# Patient Record
Sex: MALE | Race: WHITE | Employment: FULL TIME | URBAN - METROPOLITAN AREA
[De-identification: names, ages, dates, MRNs, and addresses within clinical notes are randomized per-mention and may not be internally consistent; named-entity substitution may affect disease eponyms.]

---

## 2017-05-30 ENCOUNTER — APPOINTMENT (OUTPATIENT)
Dept: OTHER | Facility: HOSPITAL | Age: 32
End: 2017-05-30
Attending: PREVENTIVE MEDICINE

## 2017-10-18 ENCOUNTER — HOSPITAL ENCOUNTER (OUTPATIENT)
Age: 32
Discharge: ANOTHER HOSPITAL | End: 2017-10-18
Attending: EMERGENCY MEDICINE
Payer: COMMERCIAL

## 2017-10-18 ENCOUNTER — HOSPITAL ENCOUNTER (EMERGENCY)
Facility: HOSPITAL | Age: 32
Discharge: HOME | End: 2017-10-18
Attending: EMERGENCY MEDICINE
Payer: COMMERCIAL

## 2017-10-18 ENCOUNTER — APPOINTMENT (OUTPATIENT)
Dept: CT IMAGING | Facility: HOSPITAL | Age: 32
End: 2017-10-18
Attending: EMERGENCY MEDICINE
Payer: COMMERCIAL

## 2017-10-18 VITALS
TEMPERATURE: 97 F | HEART RATE: 67 BPM | SYSTOLIC BLOOD PRESSURE: 148 MMHG | OXYGEN SATURATION: 98 % | RESPIRATION RATE: 16 BRPM | DIASTOLIC BLOOD PRESSURE: 88 MMHG

## 2017-10-18 VITALS
RESPIRATION RATE: 16 BRPM | TEMPERATURE: 98 F | SYSTOLIC BLOOD PRESSURE: 133 MMHG | DIASTOLIC BLOOD PRESSURE: 89 MMHG | OXYGEN SATURATION: 99 % | BODY MASS INDEX: 22.53 KG/M2 | WEIGHT: 170 LBS | HEART RATE: 69 BPM | HEIGHT: 73 IN

## 2017-10-18 DIAGNOSIS — K29.70 GASTRITIS WITHOUT BLEEDING, UNSPECIFIED CHRONICITY, UNSPECIFIED GASTRITIS TYPE: ICD-10-CM

## 2017-10-18 DIAGNOSIS — R11.0 NAUSEA: Primary | ICD-10-CM

## 2017-10-18 DIAGNOSIS — R10.9 ABDOMINAL PAIN OF UNKNOWN ETIOLOGY: Primary | ICD-10-CM

## 2017-10-18 DIAGNOSIS — R10.10 UPPER ABDOMINAL PAIN: ICD-10-CM

## 2017-10-18 PROCEDURE — 99215 OFFICE O/P EST HI 40 MIN: CPT

## 2017-10-18 PROCEDURE — 96374 THER/PROPH/DIAG INJ IV PUSH: CPT

## 2017-10-18 PROCEDURE — 99284 EMERGENCY DEPT VISIT MOD MDM: CPT

## 2017-10-18 PROCEDURE — 80053 COMPREHEN METABOLIC PANEL: CPT | Performed by: EMERGENCY MEDICINE

## 2017-10-18 PROCEDURE — 93010 ELECTROCARDIOGRAM REPORT: CPT

## 2017-10-18 PROCEDURE — 96375 TX/PRO/DX INJ NEW DRUG ADDON: CPT

## 2017-10-18 PROCEDURE — 99205 OFFICE O/P NEW HI 60 MIN: CPT

## 2017-10-18 PROCEDURE — 83690 ASSAY OF LIPASE: CPT | Performed by: EMERGENCY MEDICINE

## 2017-10-18 PROCEDURE — 81003 URINALYSIS AUTO W/O SCOPE: CPT | Performed by: EMERGENCY MEDICINE

## 2017-10-18 PROCEDURE — 96361 HYDRATE IV INFUSION ADD-ON: CPT

## 2017-10-18 PROCEDURE — 74177 CT ABD & PELVIS W/CONTRAST: CPT | Performed by: EMERGENCY MEDICINE

## 2017-10-18 PROCEDURE — 93005 ELECTROCARDIOGRAM TRACING: CPT

## 2017-10-18 PROCEDURE — 85025 COMPLETE CBC W/AUTO DIFF WBC: CPT | Performed by: EMERGENCY MEDICINE

## 2017-10-18 RX ORDER — PANTOPRAZOLE SODIUM 40 MG/1
40 TABLET, DELAYED RELEASE ORAL DAILY
Qty: 30 TABLET | Refills: 0 | Status: SHIPPED | OUTPATIENT
Start: 2017-10-18 | End: 2017-11-17

## 2017-10-18 RX ORDER — KETOROLAC TROMETHAMINE 30 MG/ML
30 INJECTION, SOLUTION INTRAMUSCULAR; INTRAVENOUS ONCE
Status: COMPLETED | OUTPATIENT
Start: 2017-10-18 | End: 2017-10-18

## 2017-10-18 RX ORDER — ONDANSETRON 2 MG/ML
4 INJECTION INTRAMUSCULAR; INTRAVENOUS ONCE
Status: COMPLETED | OUTPATIENT
Start: 2017-10-18 | End: 2017-10-18

## 2017-10-18 RX ORDER — MAGNESIUM HYDROXIDE/ALUMINUM HYDROXICE/SIMETHICONE 120; 1200; 1200 MG/30ML; MG/30ML; MG/30ML
30 SUSPENSION ORAL ONCE
Status: COMPLETED | OUTPATIENT
Start: 2017-10-18 | End: 2017-10-18

## 2017-10-18 NOTE — ED INITIAL ASSESSMENT (HPI)
Patient states he developed nausea on Sunday. Threw up once that day and has had the nausea ever since. Decreased appetite. Denies vomiting since Sunday. Denies diarrhea. States he feels fine otherwise.  abd is slightly tender to touch in the epigastric are

## 2017-10-18 NOTE — ED PROVIDER NOTES
Patient presents with:  Nausea    HPI:     Nik Carey is a 27year old male who presents with chief complaint of nausea, epigastric and RUQ abdominal pain. Started 4 days ago with after eating a turkey sandwich.  States he has had significant nausea s preferred thorough evaluation in the ED to r/o hepatobiliary pathology. Pt feels comfortable driving to the ED for further evaluation. Understands to stop and call 911 of any acute, severe worsening of symptoms. Vitals stable upon discharge.      Assessm

## 2017-10-18 NOTE — ED PROVIDER NOTES
Patient Seen in: [facility] Emergency Department    History   Patient presents with:  Abdomen/Flank Pain (GI/)  Nausea/Vomiting/Diarrhea (gastrointestinal)    Stated Complaint: sent from immediate care for epigastric pain/nausea    HPI    This is negative except as noted above. PSFH elements reviewed from today and agreed except as otherwise stated in HPI.     Physical Exam   ED Triage Vitals [10/18/17 1026]  BP: 149/99  Pulse: 63  Resp: 14  Temp: 97.9 °F (36.6 °C)  Temp src: Temporal  SpO2: 99 % Final result                 Please view results for these tests on the individual orders.    RAINBOW DRAW GOLD       ============================================================  ED Course  ------------------------------------------------------------  MDM went back and reexamined most of his pain is in the epigastric area of discussed him he could be gastritis he has no significant right lower quadrant tenderness no rebound, guarding I discussed importance of close follow-up to return if increasing pain dis

## 2017-10-18 NOTE — ED INITIAL ASSESSMENT (HPI)
Pain to epigastric area and right side since Sunday. Has constant nausea, no vomiting. Denies any diarrhea or fevers.

## 2018-05-02 ENCOUNTER — APPOINTMENT (OUTPATIENT)
Dept: OTHER | Facility: HOSPITAL | Age: 33
End: 2018-05-02
Attending: FAMILY MEDICINE

## 2019-12-20 ENCOUNTER — HOSPITAL ENCOUNTER (OUTPATIENT)
Age: 34
Discharge: HOME | End: 2019-12-20
Attending: FAMILY MEDICINE
Payer: COMMERCIAL

## 2019-12-20 VITALS
DIASTOLIC BLOOD PRESSURE: 72 MMHG | RESPIRATION RATE: 18 BRPM | TEMPERATURE: 97 F | SYSTOLIC BLOOD PRESSURE: 126 MMHG | HEART RATE: 70 BPM | OXYGEN SATURATION: 100 %

## 2019-12-20 DIAGNOSIS — J45.40 MODERATE PERSISTENT ASTHMA WITHOUT COMPLICATION: Primary | ICD-10-CM

## 2019-12-20 PROCEDURE — 99213 OFFICE O/P EST LOW 20 MIN: CPT

## 2019-12-20 PROCEDURE — 99214 OFFICE O/P EST MOD 30 MIN: CPT

## 2019-12-20 NOTE — ED PROVIDER NOTES
Patient Seen in: [facility] [place] [facility]      History   Patient presents with:  Asthma    Stated Complaint: need medicine refill     HPI    64-year-old male with history of asthma and seasonal allergies presents for refill on Breo.   Patient state tympanic membrane, ear canal and external ear normal.      Nose: Nose normal.      Mouth/Throat:      Pharynx: Uvula midline.    Eyes:      General: Lids are normal.      Conjunctiva/sclera: Conjunctivae normal.      Pupils: Pupils are equal, round, and lynnette

## 2019-12-20 NOTE — ED INITIAL ASSESSMENT (HPI)
Has history of asthma and needs refill on breo inhaler. Had samples and thought he would be fine, but noticed they are  and his pcp could not see him before he leaves out of town.

## 2020-02-18 ENCOUNTER — OFFICE VISIT (OUTPATIENT)
Dept: FAMILY MEDICINE CLINIC | Facility: CLINIC | Age: 35
End: 2020-02-18
Payer: COMMERCIAL

## 2020-02-18 VITALS
OXYGEN SATURATION: 99 % | BODY MASS INDEX: 23.19 KG/M2 | HEIGHT: 73 IN | SYSTOLIC BLOOD PRESSURE: 126 MMHG | WEIGHT: 175 LBS | TEMPERATURE: 98 F | HEART RATE: 63 BPM | DIASTOLIC BLOOD PRESSURE: 70 MMHG | RESPIRATION RATE: 16 BRPM

## 2020-02-18 DIAGNOSIS — J45.40 MODERATE PERSISTENT ASTHMA WITHOUT COMPLICATION: Primary | ICD-10-CM

## 2020-02-18 DIAGNOSIS — Z23 NEED FOR PNEUMOCOCCAL VACCINATION: ICD-10-CM

## 2020-02-18 PROCEDURE — 90471 IMMUNIZATION ADMIN: CPT | Performed by: FAMILY MEDICINE

## 2020-02-18 PROCEDURE — 90732 PPSV23 VACC 2 YRS+ SUBQ/IM: CPT | Performed by: FAMILY MEDICINE

## 2020-02-18 PROCEDURE — 99203 OFFICE O/P NEW LOW 30 MIN: CPT | Performed by: FAMILY MEDICINE

## 2020-02-18 NOTE — PROGRESS NOTES
Patient presents with:  Asthma: ACT/AAP 22 score  Establish Care: New to PCP      HPI:     This 38year old male patient with known history of asthma presents for management of asthma and medication refills.  He has been on several different inhalers in the tightness, shortness of breath and wheezing. Cardiovascular: Negative for chest pain, palpitations and leg swelling. Gastrointestinal: Negative for nausea, vomiting, abdominal pain, diarrhea, blood in stool and abdominal distention.    Genitourinary: N persistent asthma without complication G70.35 Fluticasone Furoate-Vilanterol (BREO ELLIPTA) 100-25 MCG/INH Inhalation Aerosol Powder, Breath Activated   2. Need for pneumococcal vaccination Z23 PNEUMOCOCCAL IMM (PNEUMOVAX)     Asthma - maintenance.  Current

## 2020-02-18 NOTE — PATIENT INSTRUCTIONS
Using an Inhaler  Your healthcare provider may prescribe medicine that you breathe in using a metered-dose inhaler (MDI). An MDI sends a measured amount of medicine in a fine mist to your lungs.  The medicine must be breathed deeply into your lungs for it comfortably. · Then breathe out slowly through your mouth. · Repeat these steps for each puff of medicine prescribed. Wait at least 60 seconds between puffs, or as long as your healthcare provider told you to wait.     Important  · Clean your inhaler as d enjoy.  · Asthma flare-ups can be dangerous, even deadly. · Uncontrolled asthma makes it more likely that you will need emergency department and in-hospital care. · Uncontrolled asthma may cause permanent damage to your lungs.     Peak flow monitoring hel

## 2020-05-11 DIAGNOSIS — J45.40 MODERATE PERSISTENT ASTHMA WITHOUT COMPLICATION: ICD-10-CM

## 2020-05-11 RX ORDER — FLUTICASONE FUROATE AND VILANTEROL TRIFENATATE 100; 25 UG/1; UG/1
POWDER RESPIRATORY (INHALATION)
Qty: 60 EACH | Refills: 0 | OUTPATIENT
Start: 2020-05-11

## 2020-05-11 NOTE — TELEPHONE ENCOUNTER
Asthma & COPD Medication Protocol Passed5/11 10:36 AM   Asthma Action Score greater than or equal to 20    Appointment in past 6 or next 3 months     AAP/ACT given in last 12 months     Requesting Fluticasone Furoate-Vilanterol (BREO ELLIPTA) 100-25 MCG/IN

## 2020-08-08 DIAGNOSIS — J45.40 MODERATE PERSISTENT ASTHMA WITHOUT COMPLICATION: ICD-10-CM

## 2020-08-11 RX ORDER — FLUTICASONE FUROATE AND VILANTEROL TRIFENATATE 100; 25 UG/1; UG/1
POWDER RESPIRATORY (INHALATION)
Qty: 3 EACH | Refills: 1 | Status: SHIPPED | OUTPATIENT
Start: 2020-08-11 | End: 2021-03-05

## 2020-08-11 NOTE — TELEPHONE ENCOUNTER
Asthma & COPD Medication Protocol Passed8/11 3:02 PM   Asthma Action Score greater than or equal to 20    Appointment in past 6 or next 3 months     AAP/ACT given in last 12 months     Refill protocol passed because the patient met the following protocol f

## 2020-08-11 NOTE — TELEPHONE ENCOUNTER
Received refill request. [facility] Patient was seen by  on 2/18/2020 as a new patient. Please call patient to confirm if he is still a patient of  or if he changed. Thanks!

## 2021-01-01 ENCOUNTER — OFFICE VISIT (OUTPATIENT)
Dept: FAMILY MEDICINE CLINIC | Facility: CLINIC | Age: 36
End: 2021-01-01
Payer: COMMERCIAL

## 2021-01-01 VITALS
BODY MASS INDEX: 23.19 KG/M2 | DIASTOLIC BLOOD PRESSURE: 99 MMHG | HEIGHT: 73 IN | TEMPERATURE: 98 F | RESPIRATION RATE: 18 BRPM | OXYGEN SATURATION: 98 % | WEIGHT: 175 LBS | SYSTOLIC BLOOD PRESSURE: 145 MMHG | HEART RATE: 87 BPM

## 2021-01-01 DIAGNOSIS — Z20.822 EXPOSURE TO COVID-19 VIRUS: ICD-10-CM

## 2021-01-01 DIAGNOSIS — R03.0 ELEVATED BLOOD PRESSURE READING: ICD-10-CM

## 2021-01-01 DIAGNOSIS — Z20.822 SUSPECTED COVID-19 VIRUS INFECTION: Primary | ICD-10-CM

## 2021-01-01 PROCEDURE — 3008F BODY MASS INDEX DOCD: CPT | Performed by: NURSE PRACTITIONER

## 2021-01-01 PROCEDURE — 99213 OFFICE O/P EST LOW 20 MIN: CPT | Performed by: NURSE PRACTITIONER

## 2021-01-01 PROCEDURE — 3080F DIAST BP >= 90 MM HG: CPT | Performed by: NURSE PRACTITIONER

## 2021-01-01 PROCEDURE — 3077F SYST BP >= 140 MM HG: CPT | Performed by: NURSE PRACTITIONER

## 2021-01-01 NOTE — PATIENT INSTRUCTIONS
Regardless of the test results you are ill. You should practice social distancing which means stay about 6 feet from people at all times, cover your cough, wash hands frequently, stay home away from others, and sanitize surfaces often.  Rest and stay hydrat you    Reducing the length of quarantine may make it easier for people to quarantine by reducing the time they cannot work. A shorter quarantine period also can lessen stress on the public health system, especially when new infections are rapidly rising. for at least 20 seconds or clean your hands with an alcohol-based hand  that contains at least 60% alcohol. 8. As much as possible, stay in a specific room and away from other people in your home.  Also, you should use a separate bathroom, if lorin of breath but have not been exposed to someone with COVID-19 and have not tested positive for COVID-19, you should also stay home and away from others for a total of 10 days after your symptoms started, and at least 24 hours after your fever is gone and sy required to have a repeat COVID-19 test in order to be eligible to donate. If you’re instructed by Blake Garcia that a repeat test is required, please contact the [address] [facility] COVID-19 Nurse Triage Line at [phone].     Additional Information      You can cough or sneeze into the bend of your elbow. · Wear a cloth face mask around other people. During a public health emergency, medical face masks may be reserved for healthcare workers. You may need to make a cloth face mask of your own.  You can do this usi be reserved for healthcare workers. You may need to make a cloth face mask of your own. You can do this using a bandana, T-shirt, or other cloth. The CDC has instructions on how to make a face mask. Wear the mask so that it covers both your nose and mouth. which fluids are best for you. Don't drink fluids that contain caffeine or alcohol. · Taking over-the-counter (OTC) pain medicine. These are used to help ease pain and reduce fever.  Follow your healthcare provider's instructions for which OTC medicine to with COVID-19, you should stay away from other people. This is called self-isolation. Your limits are different if you've had COVID-19 in the last 3 months but are fully recovered without symptoms and you have been exposed to someone with COVID-19.  If you retest you for COVID-19. Follow your provider's instructions.   When you return to public settings  When you are well enough to go outside your home, consider the CDC's guidance on cloth face masks:     · The CDC advises all people over age 2 to wear cloth on 4/1/2020  © 0272-1951 [employer]. All rights reserved. This information is not intended as a substitute for professional medical care. Always follow your healthcare professional's instructions.

## 2021-01-01 NOTE — PROGRESS NOTES
CHIEF COMPLAINT:   Patient presents with:  Covid: exposure, wife positive, symptoms        HPI:   Clyde Anaya is a 38year old male presents to clinic with complaints of ill symptoms. Patient has had symptoms for 2 days.    Reports: headache, nausea, NOSE: nostrils patent, clear nasal mucus, nasal mucosa pink and mild inflamed  THROAT: oral mucosa pink, moist. Posterior pharynx not erythematous and injected. No exudates. Tonsils 1/4. Uvula is midline. Breath is not malodorous.   No trismus, hoarseness Seek immediate care for worsening or concerning symptoms. Follow up with your primary care doctor if not improving.      Coronavirus Disease 2019 (COVID-19)     [facility] is committed to the safety and well-being of our patients, members, Madiha Judd Your local public health authorities make the final decisions about how long quarantine should last, based on local conditions and needs. Follow the recommendations of your local public health department if you need to quarantine.  Options they will conside 8. As much as possible, stay in a specific room and away from other people in your home. Also, you should use a separate bathroom, if available. If you need to be around other people in or outside of the home, wear a facemask.    9. Avoid sharing personal i If you have a fever with cough or shortness of breath but have not been exposed to someone with COVID-19 and have not tested positive for COVID-19, you should also stay home and away from others for a total of 10 days after your symptoms started, and at [employer] · Be symptom-free for at least 14 days*    *Some people will be required to have a repeat COVID-19 test in order to be eligible to donate.  If you’re instructed by Chiara Dalal that a repeat test is required, please contact the [facility] COVID-[facility] Nurse Tri · If you need to cough or sneeze, do it into a tissue. Then throw the tissue into the trash. If you don't have tissues, cough or sneeze into the bend of your elbow. · Wear a cloth face mask around other people.  During a public health emergency, medical fa · Wear a face mask. This is to protect other people from your germs. If you are not able to wear a mask, your caregivers should. During a public health emergency, medical face masks may be reserved for healthcare workers.  You may need to make a cloth face · Staying hydrated. Drinking liquids is the best way to prevent dehydration. Try to drink 6 to 8 glasses of liquids every day, or as advised by your provider. Also check with your provider about which fluids are best for you.  Don't drink fluids that conta · Don’t let anyone share household items with the sick person. This includes eating and drinking tools, towels, sheets, or blankets. · Clean fabrics and laundry thoroughly. · Keep other people and pets away from the sick person.     When you can stop self If you have a weak immune system and COVID-19, or if you've had severe COVID-19,  your instructions on when to stop isolation will be somewhat different. Some conditions and treatments can cause a weak immune system.  These include cancer treatment, bone ma · Confusion or trouble waking  · Fainting or loss of consciousness  · Coughing up blood  Going home from the hospital  If you were diagnosed with COVID-19 and were recently discharged from the hospital:  · Follow the instructions above for self-care and is

## 2021-01-03 LAB — SARS-COV-2 RNA,QUAL, RT-PCR: NOT DETECTED

## 2021-02-10 DIAGNOSIS — J45.40 MODERATE PERSISTENT ASTHMA WITHOUT COMPLICATION: ICD-10-CM

## 2021-02-10 NOTE — TELEPHONE ENCOUNTER
Requesting Breo Ellipta 100/25mcg/inh  LOV: 2/18/2020  RTC: prn  Last Relevant Labs: ACT/AAP done 3/2/2020  Filled: 8/11/2020 #3 with 1 refills    No future appointments.     Failed protocol:  Asthma & COPD Medication Protocol Ihgtlf00/10/2021 08:52 AM   Ap

## 2021-02-11 DIAGNOSIS — Z23 NEED FOR VACCINATION: ICD-10-CM

## 2021-03-04 NOTE — TELEPHONE ENCOUNTER
Multiple phone attmepts made to have pt schedule appt. No future appointments. [employer] message will be sent. He is active on [employer], he updated his occupation through [employer] on 2/2/2021. No future appointments.

## 2021-03-05 RX ORDER — FLUTICASONE FUROATE AND VILANTEROL TRIFENATATE 100; 25 UG/1; UG/1
POWDER RESPIRATORY (INHALATION)
Qty: 180 EACH | Refills: 0 | Status: SHIPPED | OUTPATIENT
Start: 2021-03-05 | End: 2021-06-09

## 2021-06-09 ENCOUNTER — OFFICE VISIT (OUTPATIENT)
Dept: FAMILY MEDICINE CLINIC | Facility: CLINIC | Age: 36
End: 2021-06-09
Payer: COMMERCIAL

## 2021-06-09 VITALS
DIASTOLIC BLOOD PRESSURE: 80 MMHG | HEIGHT: 73 IN | TEMPERATURE: 98 F | SYSTOLIC BLOOD PRESSURE: 135 MMHG | BODY MASS INDEX: 24.52 KG/M2 | WEIGHT: 185 LBS | RESPIRATION RATE: 16 BRPM | HEART RATE: 77 BPM | OXYGEN SATURATION: 98 %

## 2021-06-09 DIAGNOSIS — J45.40 MODERATE PERSISTENT ASTHMA WITHOUT COMPLICATION: ICD-10-CM

## 2021-06-09 DIAGNOSIS — Z00.00 WELL ADULT EXAM: Primary | ICD-10-CM

## 2021-06-09 DIAGNOSIS — J45.909 ASTHMA: ICD-10-CM

## 2021-06-09 DIAGNOSIS — I10 ELEVATED BLOOD PRESSURE READING IN OFFICE WITH WHITE COAT SYNDROME, WITH DIAGNOSIS OF HYPERTENSION: ICD-10-CM

## 2021-06-09 DIAGNOSIS — Z80.8 FAMILY HISTORY OF MELANOMA: ICD-10-CM

## 2021-06-09 PROCEDURE — 3075F SYST BP GE 130 - 139MM HG: CPT | Performed by: FAMILY MEDICINE

## 2021-06-09 PROCEDURE — 3079F DIAST BP 80-89 MM HG: CPT | Performed by: FAMILY MEDICINE

## 2021-06-09 PROCEDURE — 99395 PREV VISIT EST AGE 18-39: CPT | Performed by: FAMILY MEDICINE

## 2021-06-09 PROCEDURE — 3008F BODY MASS INDEX DOCD: CPT | Performed by: FAMILY MEDICINE

## 2021-06-09 RX ORDER — FLUTICASONE FUROATE AND VILANTEROL TRIFENATATE 100; 25 UG/1; UG/1
1 POWDER RESPIRATORY (INHALATION) DAILY
Qty: 60 EACH | Refills: 3 | Status: SHIPPED | OUTPATIENT
Start: 2021-06-09 | End: 2021-09-08

## 2021-06-09 RX ORDER — FLUTICASONE FUROATE AND VILANTEROL TRIFENATATE 100; 25 UG/1; UG/1
1 POWDER RESPIRATORY (INHALATION) DAILY
Qty: 180 EACH | Refills: 0 | Status: SHIPPED | OUTPATIENT
Start: 2021-06-09 | End: 2021-06-09

## 2021-06-09 RX ORDER — ALBUTEROL SULFATE 90 UG/1
2 AEROSOL, METERED RESPIRATORY (INHALATION) EVERY 4 HOURS PRN
Qty: 3 EACH | Refills: 0 | Status: SHIPPED | OUTPATIENT
Start: 2021-06-09

## 2021-06-09 NOTE — PROGRESS NOTES
Patient presents with:  Physical: Routine annual physical  Asthma: ACT/AAP      HPI:     This 43year old male patient with known history of asthma presents for management of asthma and medication refills.  He has been on several different inhalers in the p Disorder Mother    • Breast Cancer Maternal Grandmother    • Kidney Disease Maternal Grandfather       Social History    Tobacco Use      Smoking status: Never Smoker      Smokeless tobacco: Never Used    Vaping Use      Vaping Use: Never used    Alcohol u canals clear  NOSE: nasal turbinates: pink, normal mucosa  THROAT: clear, without exudates  LUNGS: clear to auscultation bilaterally; no rales, rhonchi, or wheezes  CARDIO:  regular rate and rhythm without murmur      MDM/Assessment/Plan:     Orders for th Asthma - maintenance. Currently asthma is under good control. Peak flows are  at goal.  Compliance with daily peak flows and medication encouraged and importance explained. Medications : Breo 1 puff QD. Asthma Action Plan reviewed with patient.   Summer Suh

## 2021-06-10 NOTE — PATIENT INSTRUCTIONS
Prevention Guidelines, Men Ages 25 to 44  Screening tests and vaccines are an important part of managing your health. A screening test is done to find possible disorders or diseases in people who don't have any symptoms.  The goal is to find a disease ear vaccine 2 doses; the second dose should be given at least 4 weeks after the first dose   Hepatitis A Men at increased risk for infection – talk with your healthcare provider 2 doses given at least 6 months apart   Hepatitis B Men at increased risk for infe be reminded to avoid intentional tanning and tanning beds. 1Those who are 25years of age, who are not up-to-date on their childhood immunizations, should get all appropriate catch-up vaccines recommended by the CDC.    Polo last reviewed this educat

## 2021-09-08 DIAGNOSIS — J45.40 MODERATE PERSISTENT ASTHMA WITHOUT COMPLICATION: ICD-10-CM

## 2021-09-08 RX ORDER — FLUTICASONE FUROATE AND VILANTEROL TRIFENATATE 100; 25 UG/1; UG/1
1 POWDER RESPIRATORY (INHALATION) DAILY
Qty: 180 EACH | Refills: 1 | Status: SHIPPED | OUTPATIENT
Start: 2021-09-08 | End: 2021-12-13

## 2021-09-08 NOTE — TELEPHONE ENCOUNTER
Asthma & COPD Medication Protocol Pjldmx8709/08/2021 07:33 AM   Asthma Action Score greater than or equal to 20    Appointment in past 6 or next 3 months     AAP/ACT given in last 12 months     Refill protocol passed because the patient met the following pro

## 2021-12-13 DIAGNOSIS — J45.40 MODERATE PERSISTENT ASTHMA WITHOUT COMPLICATION: ICD-10-CM

## 2022-01-24 ENCOUNTER — OFFICE VISIT (OUTPATIENT)
Dept: FAMILY MEDICINE CLINIC | Facility: CLINIC | Age: 37
End: 2022-01-24
Payer: COMMERCIAL

## 2022-01-24 VITALS
HEART RATE: 90 BPM | WEIGHT: 180.38 LBS | OXYGEN SATURATION: 98 % | BODY MASS INDEX: 23.91 KG/M2 | TEMPERATURE: 97 F | HEIGHT: 73 IN | DIASTOLIC BLOOD PRESSURE: 90 MMHG | SYSTOLIC BLOOD PRESSURE: 128 MMHG | RESPIRATION RATE: 16 BRPM

## 2022-01-24 DIAGNOSIS — M54.2 NECK PAIN: Primary | ICD-10-CM

## 2022-01-24 PROCEDURE — 3080F DIAST BP >= 90 MM HG: CPT | Performed by: FAMILY MEDICINE

## 2022-01-24 PROCEDURE — 99214 OFFICE O/P EST MOD 30 MIN: CPT | Performed by: FAMILY MEDICINE

## 2022-01-24 PROCEDURE — 3074F SYST BP LT 130 MM HG: CPT | Performed by: FAMILY MEDICINE

## 2022-01-24 PROCEDURE — 3008F BODY MASS INDEX DOCD: CPT | Performed by: FAMILY MEDICINE

## 2022-01-24 RX ORDER — CEPHALEXIN 500 MG/1
500 CAPSULE ORAL 2 TIMES DAILY
Qty: 20 CAPSULE | Refills: 0 | Status: SHIPPED | OUTPATIENT
Start: 2022-01-24 | End: 2022-02-03

## 2022-01-25 NOTE — PROGRESS NOTES
HPI:   Scarlett Lubin is a 43year old male that presents for Patient presents with:  Neck Pain: on right side - states its day 3-4 with the pain    Patient states of having some neck pain on the right side of his neck he points to the right side of the t EOMI, PERRLA, no scleral icterus, conjunctivae clear, no eye discharge        Ears: both external ear canals without any swelling or redness, both tympanic membranes appear without erythema or effusion or rupture.         Nose: both nares are patent without

## 2022-06-02 DIAGNOSIS — J45.40 MODERATE PERSISTENT ASTHMA WITHOUT COMPLICATION: ICD-10-CM

## 2022-06-06 RX ORDER — FLUTICASONE FUROATE AND VILANTEROL TRIFENATATE 100; 25 UG/1; UG/1
1 POWDER RESPIRATORY (INHALATION) DAILY
Qty: 180 EACH | Refills: 0 | Status: SHIPPED | OUTPATIENT
Start: 2022-06-06

## 2022-07-25 ENCOUNTER — PATIENT MESSAGE (OUTPATIENT)
Dept: FAMILY MEDICINE CLINIC | Facility: CLINIC | Age: 37
End: 2022-07-25

## 2022-07-26 NOTE — TELEPHONE ENCOUNTER
Patient declines appt at this time. Advised if symptoms worsen to call us or go to nearest ER for evaluation. Home

## 2022-07-26 NOTE — TELEPHONE ENCOUNTER
From: Ramone Ford  To: Yoly Mercado DO  Sent: 7/25/2022 10:07 AM CDT  Subject: Covid    Dr. Anabella Cameron,  I hope all is well with you and your family. I recently contracted covid (first time) and I wanted to see what, if any additional meds I could be taking. I have had a low grade fever for the last 3 days, headache, sore throat and some tightness in my chest with a cough. I have been taking Tylenol and over the counter cold meds. Any guidance you can provide would be appreciated.

## 2022-09-26 ENCOUNTER — TELEPHONE (OUTPATIENT)
Dept: FAMILY MEDICINE CLINIC | Facility: CLINIC | Age: 37
End: 2022-09-26

## 2022-09-26 NOTE — TELEPHONE ENCOUNTER
Flutic/vilan generic for breo ellipta 100-25mcg oral #60 Plan does not cover. Fax back with approval, placed in MA's folder.

## 2022-09-28 DIAGNOSIS — J45.40 MODERATE PERSISTENT ASTHMA WITHOUT COMPLICATION: ICD-10-CM

## 2022-09-30 RX ORDER — FLUTICASONE FUROATE AND VILANTEROL TRIFENATATE 100; 25 UG/1; UG/1
1 POWDER RESPIRATORY (INHALATION) DAILY
Qty: 60 EACH | Refills: 0 | Status: SHIPPED | OUTPATIENT
Start: 2022-09-30

## 2022-10-21 ENCOUNTER — TELEMEDICINE (OUTPATIENT)
Dept: FAMILY MEDICINE CLINIC | Facility: CLINIC | Age: 37
End: 2022-10-21

## 2022-10-21 VITALS — DIASTOLIC BLOOD PRESSURE: 86 MMHG | SYSTOLIC BLOOD PRESSURE: 122 MMHG

## 2022-10-21 DIAGNOSIS — J45.40 MODERATE PERSISTENT ASTHMA WITHOUT COMPLICATION: ICD-10-CM

## 2022-10-21 DIAGNOSIS — J45.909 ASTHMA: ICD-10-CM

## 2022-10-21 PROCEDURE — 99213 OFFICE O/P EST LOW 20 MIN: CPT | Performed by: FAMILY MEDICINE

## 2022-10-21 PROCEDURE — 3079F DIAST BP 80-89 MM HG: CPT | Performed by: FAMILY MEDICINE

## 2022-10-21 PROCEDURE — 3074F SYST BP LT 130 MM HG: CPT | Performed by: FAMILY MEDICINE

## 2022-10-21 RX ORDER — FLUTICASONE FUROATE AND VILANTEROL 100; 25 UG/1; UG/1
1 POWDER RESPIRATORY (INHALATION) DAILY
Qty: 60 EACH | Refills: 2 | Status: SHIPPED | OUTPATIENT
Start: 2022-10-21

## 2022-10-21 RX ORDER — ALBUTEROL SULFATE 90 UG/1
2 AEROSOL, METERED RESPIRATORY (INHALATION) EVERY 4 HOURS PRN
Qty: 3 EACH | Refills: 1 | Status: SHIPPED | OUTPATIENT
Start: 2022-10-21

## 2022-12-23 ENCOUNTER — TELEPHONE (OUTPATIENT)
Dept: SCHEDULING | Age: 37
End: 2022-12-23

## 2023-03-26 DIAGNOSIS — J45.40 MODERATE PERSISTENT ASTHMA WITHOUT COMPLICATION: ICD-10-CM

## 2023-03-28 RX ORDER — FLUTICASONE FUROATE AND VILANTEROL TRIFENATATE 100; 25 UG/1; UG/1
POWDER RESPIRATORY (INHALATION)
Qty: 60 EACH | Refills: 2 | Status: SHIPPED | OUTPATIENT
Start: 2023-03-28

## 2023-05-03 ENCOUNTER — WALK IN (OUTPATIENT)
Dept: URGENT CARE | Age: 38
End: 2023-05-03

## 2023-05-03 VITALS
DIASTOLIC BLOOD PRESSURE: 96 MMHG | WEIGHT: 165 LBS | RESPIRATION RATE: 20 BRPM | SYSTOLIC BLOOD PRESSURE: 136 MMHG | HEART RATE: 100 BPM | TEMPERATURE: 98.9 F | BODY MASS INDEX: 21.87 KG/M2 | HEIGHT: 73 IN

## 2023-05-03 DIAGNOSIS — J02.0 STREP PHARYNGITIS: Primary | ICD-10-CM

## 2023-05-03 DIAGNOSIS — J30.9 ALLERGIC RHINITIS, UNSPECIFIED SEASONALITY, UNSPECIFIED TRIGGER: ICD-10-CM

## 2023-05-03 PROBLEM — I10 ELEVATED BLOOD PRESSURE READING IN OFFICE WITH WHITE COAT SYNDROME, WITH DIAGNOSIS OF HYPERTENSION: Status: ACTIVE | Noted: 2021-06-09

## 2023-05-03 PROBLEM — T78.40XA ALLERGIES: Status: ACTIVE | Noted: 2023-05-03

## 2023-05-03 LAB
INTERNAL PROCEDURAL CONTROLS ACCEPTABLE: YES
S PYO AG THROAT QL IA.RAPID: POSITIVE
TEST LOT EXPIRATION DATE: ABNORMAL
TEST LOT NUMBER: ABNORMAL

## 2023-05-03 PROCEDURE — 3075F SYST BP GE 130 - 139MM HG: CPT | Performed by: NURSE PRACTITIONER

## 2023-05-03 PROCEDURE — 99202 OFFICE O/P NEW SF 15 MIN: CPT | Performed by: NURSE PRACTITIONER

## 2023-05-03 PROCEDURE — 3080F DIAST BP >= 90 MM HG: CPT | Performed by: NURSE PRACTITIONER

## 2023-05-03 PROCEDURE — 87880 STREP A ASSAY W/OPTIC: CPT | Performed by: NURSE PRACTITIONER

## 2023-05-03 RX ORDER — ALBUTEROL SULFATE 90 UG/1
AEROSOL, METERED RESPIRATORY (INHALATION)
COMMUNITY

## 2023-05-03 RX ORDER — AMOXICILLIN 500 MG/1
500 TABLET, FILM COATED ORAL 2 TIMES DAILY
Qty: 20 TABLET | Refills: 0 | Status: SHIPPED | OUTPATIENT
Start: 2023-05-03 | End: 2023-05-13

## 2023-05-03 RX ORDER — FLUTICASONE FUROATE AND VILANTEROL TRIFENATATE 100; 25 UG/1; UG/1
1 POWDER RESPIRATORY (INHALATION) DAILY
COMMUNITY
Start: 2023-03-28

## 2023-05-05 ENCOUNTER — TELEPHONE (OUTPATIENT)
Dept: URGENT CARE | Age: 38
End: 2023-05-05

## 2023-07-05 DIAGNOSIS — J45.40 MODERATE PERSISTENT ASTHMA WITHOUT COMPLICATION: ICD-10-CM

## 2023-07-05 DIAGNOSIS — Z00.00 LABORATORY EXAM ORDERED AS PART OF ROUTINE GENERAL MEDICAL EXAMINATION: Primary | ICD-10-CM

## 2023-07-05 RX ORDER — FLUTICASONE FUROATE AND VILANTEROL TRIFENATATE 100; 25 UG/1; UG/1
POWDER RESPIRATORY (INHALATION)
Qty: 60 EACH | Refills: 1 | Status: SHIPPED | OUTPATIENT
Start: 2023-07-05

## 2023-07-05 NOTE — TELEPHONE ENCOUNTER
Requested Renewals     Name from pharmacy: [facility] 100-25MCG ORAL INH(30)         Will file in chart as: BREO ELLIPTA 100-25 MCG/ACT Inhalation Aerosol Powder, Breath Activated    Sig: INHALE 1 PUFF INTO THE LUNGS DAILY    Disp: 61 each    Refills: 2 (Pharmacy requested: Not specified)    Start: 7/5/2023    Class: Normal    Non-formulary For: Moderate persistent asthma without complication    Last ordered: 3 months ago by Neda Mckeon DO Last refill: 6/3/2023    Rx #: [id number]    Asthma & COPD Medication Protocol Uzsuuw1307/05/2023 11:52 AM    Asthma Action Score greater than or equal to 20    Appointment in past 6 or next 3 months    AAP/ACT given in last 12 months             No future appointments. LOV: 10/21/22 for asthma   Last physical done 6/9/21    [employer] message sent to patient to schedule annual exam and to have labs done beforehand.    -medication and labs pended for review.

## 2023-10-04 DIAGNOSIS — J45.40 MODERATE PERSISTENT ASTHMA WITHOUT COMPLICATION: ICD-10-CM

## 2023-10-05 RX ORDER — FLUTICASONE FUROATE AND VILANTEROL 100; 25 UG/1; UG/1
1 POWDER RESPIRATORY (INHALATION) DAILY
Qty: 60 EACH | Refills: 1 | Status: SHIPPED | OUTPATIENT
Start: 2023-10-05

## 2023-10-05 NOTE — TELEPHONE ENCOUNTER
Requested Renewals     Name from pharmacy: [facility] 100-25MCG ORAL INH(30)         Will file in chart as: BREO ELLIPTA 100-25 MCG/ACT Inhalation Aerosol Powder, Breath Activated    Sig: INHALE 1 PUFF INTO THE LUNGS DAILY    Disp: 61 each    Refills: 1 (Pharmacy requested: Not specified)    Start: 10/4/2023    Class: Normal    Non-formulary For: Moderate persistent asthma without complication    Last ordered: 3 months ago (7/5/2023) by Tiana Montgomery DO    Last refill: 9/1/2023    Rx #: [id number]    Asthma & COPD Medication Protocol Zguyqt32/04/2023 07:32 AM    Asthma Action Score greater than or equal to 20    AAP/ACT given in last 12 months    Appointment in past 6 or next 3 months             Future Appointments   Date Time Provider Marycarmen Kahn   11/7/2023  2:15 PM Cephas Galeazzi, DO [facility] [address] [facility] [address]     LOV: 10/21/22 telemedicine  Last refill given on 7/5/23 for #60 with 1 refill    -medication pended for review.

## 2023-11-07 ENCOUNTER — OFFICE VISIT (OUTPATIENT)
Dept: FAMILY MEDICINE CLINIC | Facility: CLINIC | Age: 38
End: 2023-11-07
Payer: COMMERCIAL

## 2023-11-07 VITALS — SYSTOLIC BLOOD PRESSURE: 126 MMHG | DIASTOLIC BLOOD PRESSURE: 86 MMHG

## 2023-11-07 DIAGNOSIS — Z23 NEED FOR PNEUMOCOCCAL VACCINE: ICD-10-CM

## 2023-11-07 DIAGNOSIS — Z23 NEED FOR TDAP VACCINATION: ICD-10-CM

## 2023-11-07 DIAGNOSIS — Z00.00 WELL ADULT EXAM: Primary | ICD-10-CM

## 2023-11-07 DIAGNOSIS — Z23 FLU VACCINE NEED: ICD-10-CM

## 2023-11-07 DIAGNOSIS — Z80.8 FAMILY HISTORY OF MELANOMA: ICD-10-CM

## 2023-11-07 DIAGNOSIS — J45.40 MODERATE PERSISTENT ASTHMA WITHOUT COMPLICATION: ICD-10-CM

## 2023-11-07 DIAGNOSIS — E78.1 HIGH TRIGLYCERIDES: ICD-10-CM

## 2023-11-07 DIAGNOSIS — J45.909 ASTHMA: ICD-10-CM

## 2023-11-07 DIAGNOSIS — E78.6 LOW HDL (UNDER 40): ICD-10-CM

## 2023-11-07 PROCEDURE — 3074F SYST BP LT 130 MM HG: CPT | Performed by: FAMILY MEDICINE

## 2023-11-07 PROCEDURE — 3079F DIAST BP 80-89 MM HG: CPT | Performed by: FAMILY MEDICINE

## 2023-11-07 PROCEDURE — 99396 PREV VISIT EST AGE 40-64: CPT | Performed by: FAMILY MEDICINE

## 2023-11-07 RX ORDER — ALBUTEROL SULFATE 90 UG/1
2 AEROSOL, METERED RESPIRATORY (INHALATION) EVERY 4 HOURS PRN
Qty: 3 EACH | Refills: 5 | Status: SHIPPED | OUTPATIENT
Start: 2023-11-07

## 2023-11-07 RX ORDER — FLUTICASONE FUROATE AND VILANTEROL 100; 25 UG/1; UG/1
1 POWDER RESPIRATORY (INHALATION) DAILY
Qty: 60 EACH | Refills: 5 | Status: SHIPPED | OUTPATIENT
Start: 2023-11-07

## 2023-11-07 NOTE — PROGRESS NOTES
Chief Complaint   Patient presents with    Physical       HPI:     This 34year old male patient with known history of asthma presents for management of asthma and medication refills. He is also here for physical.     Asthma- He is on Breo now and tolerating it well. Has had asthma since childhood. Well controlled now. No recent exacerbation.    HDL 32   A1c 5.1- these labs are from Feb 2023     Patient will send new blood test results in 2024 which he gets with his employer. PMHx: asthma  FMHx:  -maternal: mom with hx of MI/CAD   -paternal: dad has hx of kidney cancer, skin cancer- melanoma, heart failure with LVAD   Smoking: none  Alcohol: occasionally   Drugs: occasional marijuana. Sexual hx: 1 partner   STD hx: declined  Occupation: teacher at [employer]. Colonoscopy: due at 37. No colon or prostate cancer in family   Tdap: due   Diet: healthy  Exercise: exercises about 3 days per week. Wt Readings from Last 6 Encounters:   01/24/22 180 lb 6 oz (81.8 kg)   06/09/21 185 lb (83.9 kg)   01/01/21 175 lb (79.4 kg)   02/18/20 175 lb (79.4 kg)   10/18/17 170 lb (77.1 kg)   06/14/14 170 lb (77.1 kg)         HISTORY:  Past Medical History:   Diagnosis Date    Allergic rhinitis     ANXIETY     FLYING    ASTHMA     Asthma     Elevated BP     Extrinsic asthma, unspecified     SEASONAL ALLERGIES       Past Surgical History:   Procedure Laterality Date    OTHER SURGICAL HISTORY      WISDOM TEETH      Family History   Problem Relation Age of Onset    Diabetes Father     Heart Disorder Father     Heart Surgery Father     Thyroid Disorder Mother     Breast Cancer Maternal Grandmother     Kidney Disease Maternal Grandfather       Social History     Socioeconomic History    Marital status:    Tobacco Use    Smoking status: Never    Smokeless tobacco: Never   Vaping Use    Vaping Use: Never used   Substance and Sexual Activity    Alcohol use:  Yes     Alcohol/week: 5.0 standard drinks of alcohol Types: 5 Standard drinks or equivalent per week     Comment: occasionally    Drug use: No    Sexual activity: Yes     Partners: Female   Other Topics Concern    Caffeine Concern No    Exercise No    Seat Belt No    Special Diet No    Stress Concern No    Weight Concern No            ROS:   Review of Systems   Constitutional: Negative for fever, chills and fatigue. No distress. HENT: Negative for hearing loss, congestion, sore throat, neck pain and dental problem. Eyes: Negative for pain and visual disturbance. Respiratory: Negative for cough, chest tightness, shortness of breath and wheezing. Cardiovascular: Negative for chest pain, palpitations and leg swelling. Gastrointestinal: Negative for nausea, vomiting, abdominal pain, diarrhea, blood in stool and abdominal distention. Genitourinary: Negative for dysuria, hematuria and difficulty urinating. Musculoskeletal: Negative for myalgias, back pain, joint swelling, arthralgias and gait problem. Skin: Negative for color change and rash. Neurological: Negative for dizziness, syncope, weakness, numbness, tingling and headaches. Hematological: Negative for adenopathy. Does not bruise/bleed easily. Psychiatric/Behavioral: The patient is not nervous/anxious. No depression.     Physical Exam:     Findings:    /86   GENERAL: well developed, well nourished, well hydrated, no distress  SKIN: good skin turgor, no obvious rashes  NECK: supple, no adenopathy, no thyromegaly  EXTREMITIES: no cyanosis, clubbing or edema  GI: soft, non-tender, normal bowel sounds  HEAD: normocephalic, atraumatic  EYES: sclera non icteric bilateral, conjunctiva clear  EARS: TM  bilateral: normal and external auditory canals clear  NOSE: nasal turbinates: pink, normal mucosa  THROAT: clear, without exudates  LUNGS: clear to auscultation bilaterally; no rales, rhonchi, or wheezes  CARDIO:  regular rate and rhythm without murmur      MDM/Assessment/Plan:     Orders for this encounter:    Orders Placed This Encounter    Prevnar 20 (PCV20) [81907]    FLULAVAL INFLUENZA VACCINE QUAD PRESERVATIVE FREE 0.5 ML    TETANUS, DIPHTHERIA TOXOIDS AND ACELLULAR PERTUSIS VACCINE (TDAP), >7 YEARS, IM USE    Derm Referral - In Network     Referral Priority:   Routine     Referral Type:   OFFICE VISIT     Referred to Provider:   Regi Ziegler MD     Requested Specialty:   DERMATOLOGY     Number of Visits Requested:   1    fluticasone furoate-vilanterol (BREO ELLIPTA) 100-25 MCG/ACT Inhalation Aerosol Powder, Breath Activated     Sig: Inhale 1 puff into the lungs daily. Dispense:  60 each     Refill:  5    albuterol (VENTOLIN HFA) 108 (90 Base) MCG/ACT Inhalation Aero Soln     Sig: Inhale 2 puffs into the lungs every 4 (four) hours as needed for Wheezing. Dispense:  3 each     Refill:  5       Labs performed this visit:  No results found for this or any previous visit (from the past 10 hour(s)). Diagnosis:    1. Moderate persistent asthma without complication  Asthma - maintenance. Currently asthma is under good control. Medications Breo daily, albuterol prn. Asthma Action Plan reviewed with patient. Patient will call if any symptoms worsen. Patient understands and agrees to the above plan. - fluticasone furoate-vilanterol (BREO ELLIPTA) 100-25 MCG/ACT Inhalation Aerosol Powder, Breath Activated; Inhale 1 puff into the lungs daily. Dispense: 60 each; Refill: 5    2. Asthma  - albuterol (VENTOLIN HFA) 108 (90 Base) MCG/ACT Inhalation Aero Soln; Inhale 2 puffs into the lungs every 4 (four) hours as needed for Wheezing. Dispense: 3 each; Refill: 5        4. Well adult exam  - -Discussed diet and exercise, counseled on vaccine and screening guidelines. 5. Need for Tdap vaccination  Counseled on risks and benefits of tdap  vaccine   - TETANUS, DIPHTHERIA TOXOIDS AND ACELLULAR PERTUSIS VACCINE (TDAP), >7 YEARS, IM USE    6.  Flu vaccine need  - FLULAVAL INFLUENZA VACCINE QUAD PRESERVATIVE FREE 0.5 ML    7. Family history of melanoma  - Derm Referral - In Network    8. High triglycerides  Advised on low fat diet/exercise. 9. Low HDL (under 40)  As above     10. Need for pneumococcal vaccine  Counseled on risks and benefits of pna vaccine   - Prevnar 20 (PCV20) [70910]          All results reviewed and discussed with patient. See AVS for detailed discharge instructions for your condition today. Follow Up with:  No follow-up provider specified.

## 2023-12-14 ENCOUNTER — TELEMEDICINE (OUTPATIENT)
Dept: FAMILY MEDICINE CLINIC | Facility: CLINIC | Age: 38
End: 2023-12-14
Payer: COMMERCIAL

## 2023-12-14 ENCOUNTER — PATIENT MESSAGE (OUTPATIENT)
Dept: FAMILY MEDICINE CLINIC | Facility: CLINIC | Age: 38
End: 2023-12-14

## 2023-12-14 DIAGNOSIS — U07.1 COVID-19: Primary | ICD-10-CM

## 2023-12-14 NOTE — PROGRESS NOTES
Subjective:   Patient ID: Mónica Hyatt is a 34year old male. HPI  Mr. Judith Johnson is a pleasant 33 y/o M with history of asthma presenting for video visit for COVID-19. He tested positive yesterday. He has been experiencing fatigue associated with low-grade fever and cough. No shortness of breath. No nausea no vomiting no abdominal.  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 He has had COVID before. I had reviewed past medical and family histories together with allergy and medication lists documented. History/Other:   Review of Systems   Constitutional:  Positive for fatigue and fever. HENT:  Positive for congestion and sore throat. Negative for trouble swallowing. Respiratory:  Positive for cough. Negative for shortness of breath. Cardiovascular:  Negative for chest pain. Gastrointestinal:  Negative for abdominal pain, diarrhea, nausea and vomiting. Current Outpatient Medications   Medication Sig Dispense Refill    nirmatrelvir-ritonavir 300-100 MG Oral Tablet Therapy Pack Take two nirmatrelvir tablets (300mg) with one ritonavir tablet (100mg) together twice daily for 5 days. +test 12/13 onset 12/13 30 tablet 0    fluticasone furoate-vilanterol (BREO ELLIPTA) 100-25 MCG/ACT Inhalation Aerosol Powder, Breath Activated Inhale 1 puff into the lungs daily. 60 each 5    albuterol (VENTOLIN HFA) 108 (90 Base) MCG/ACT Inhalation Aero Soln Inhale 2 puffs into the lungs every 4 (four) hours as needed for Wheezing. 3 each 5     Allergies: Allergies   Allergen Reactions    Seasonal Runny nose       Objective:   Physical Exam  Constitutional:       General: He is not in acute distress. Neurological:      Mental Status: He is alert. Assessment & Plan:   1.  COVID-19    -keep hydrated  -take Tylenol prn for fever or pain  - Paxlovid sent to pharm  -go to ED if with respiratory distress or dehydration  - call or come in as needed if symptoms persist or worsen      This note was prepared using [email or web address] [facility] voice recognition dictation software. As a result errors may occur. When identified these errors have been corrected. While every attempt is made to correct errors during dictation discrepancies may still exist            No orders of the defined types were placed in this encounter. Meds This Visit:  Requested Prescriptions     Signed Prescriptions Disp Refills    nirmatrelvir-ritonavir 300-100 MG Oral Tablet Therapy Pack 30 tablet 0     Sig: Take two nirmatrelvir tablets (300mg) with one ritonavir tablet (100mg) together twice daily for 5 days.  +test 12/13 onset 12/13       Imaging & Referrals:  None

## 2023-12-14 NOTE — PATIENT INSTRUCTIONS
Thank you for choosing Silvia Soto MD at [place] [address]  To Do: Pierre Reyes  1. Please take meds as directed. [facility] is located in [address]. Monday, Tuesday & Friday - 8 a.m. to 4 p.m. Wednesday, Thursday - 7 a.m. to 3 p.m. The lab is closed daily from 12 p.m.-12:30 p.m. Saturday lab hours by appointment. Call [phone] to schedule the appointment. Please signup for [employer], which is electronic access to your record if you have not done so. All your results will post on there. [email or web address]   You can NOW use [employer] to book your appointments with us, or consider using open access scheduling which is through the [facility] website [email or web address] and type in Silvia Soto MD and follow the links for \"Schedule Online Now\"    To schedule Imaging or tests at [facility] Scheduling [phone], Go to [facility] A ER Building (For example: CT scans, X rays, Ultrasound, MRI)  Cardiac Testing in ER building Building A second floor Cardiac Testing [phone] (For example: Holter Monitor, Cardiac Stress tests,Event Monitor, or 2D Echocardiograms)  [facility] call [phone] usually in [facility] A  Walk in Clinic in [place] at [facility] Mon-Fri at 8am-7:30 p.m., and Sat/Sun 9:00a. m.-4:30 p.m. Also at [address]  Call [phone] for info     Please call our office about any questions regarding your treatment/medicines/tests as a result of today's visit. For your safety, read the entire package insert of all medicines prescribed to you and be aware of all of the risks of treatment even beyond those discussed today. All therapies have potential risk of harm or side effects or medication interactions.   It is your duty and for your safety to discuss with the pharmacist and our office with questions, and to notify us and stop treatment if problems arise, but know that our intention is that the benefits outweigh those potential risks and we strive to make you healthier and to improve your quality of life. Referrals, and Radiology Information:    If your insurance requires a referral to a specialist, please allow 5 business days to process your referral request.    If Elda Mays MD orders a CT or MRI, it may take up to 10 business days to receive approval from your insurance company. Once our office has called informing you that the insurance company approved your testing, please call Central Scheduling at [phone]  Please allow our office 5 business days to contact you regarding any testing results. Refill policies:   Allow 3 business days for refills; controlled substances may take longer and must be picked up from the office in person. Narcotic medications can only be filled in 30 day increments and must be refilled at an office visit only. If your prescription is due for a refill, you may be due for a follow-up appointment. We cannot refill your maintenance medications at a preventative wellness visit. To best provide you care, patients receiving maintenance medications need to be seen at least twice a year.

## 2023-12-14 NOTE — TELEPHONE ENCOUNTER
From: Davey Aleman  To: Tiana Ulises  Sent: 12/14/2023 7:51 AM CST  Subject: Question    Dr. Tacos Martinez all is well with you and your family. I just discovered this morning that I have covid. Symptoms started yesterday. Checking with you to see if I meet the criteria to take the Covid medication to help it pass. Note feeling horrible right now, but thought I would get your advice. Thanks for your assistance.

## 2024-01-15 ENCOUNTER — PATIENT MESSAGE (OUTPATIENT)
Dept: FAMILY MEDICINE CLINIC | Facility: CLINIC | Age: 39
End: 2024-01-15

## 2024-01-15 NOTE — TELEPHONE ENCOUNTER
From: Pierre Reyes  To: Pranav Reilly  Sent: 1/15/2024 8:32 AM CST  Subject: Question    I hope all is well with you and your family. Just wanted to get your advice on a headache that I have been having recently. I have had a headache for multiple days now, and when I take any over the counter medication I only get minimal relief. Could this potentially be the ramifications of Covid?

## 2024-09-10 DIAGNOSIS — J45.40 MODERATE PERSISTENT ASTHMA WITHOUT COMPLICATION (HCC): ICD-10-CM

## 2024-09-17 RX ORDER — FLUTICASONE FUROATE AND VILANTEROL TRIFENATATE 100; 25 UG/1; UG/1
1 POWDER RESPIRATORY (INHALATION) DAILY
Qty: 60 EACH | Refills: 1 | Status: SHIPPED | OUTPATIENT
Start: 2024-09-17

## 2024-09-17 NOTE — TELEPHONE ENCOUNTER
Requesting             Disp Refills Start End     fluticasone furoate-vilanterol (BREO ELLIPTA) 100-25 MCG/ACT Inhalation Aerosol Powder, Breath Activated 60 each 5 11/7/2023 --    Sig - Route: Inhale 1 puff into the lungs daily. - Inhalation    Sent to pharmacy as: Fluticasone Furoate-Vilanterol 100-25 MCG/ACT Inhalation Aerosol Powder Breath Activated (Breo Ellipta)      Telemed 12/14/2023 for COVID  LOV: 11/7/2023 for physical  RTC: Return in about 1 year (around 11/7/2024) for physical.    Last Relevant Labs: 11/17/2023 - ACT = 22    Filled:     Dispensed Written Strength Quantity Refills Days Supply Provider Pharmacy    BREO ELLIPTA 100-25MCG ORAL INH(30) 07/07/2024 11/07/2023  60 each  30 Pranav Reilly DO [facility] #...     No future appointments.    Rx sent  MCM sent to patient to schedule annual physical

## 2024-12-04 DIAGNOSIS — J45.40 MODERATE PERSISTENT ASTHMA WITHOUT COMPLICATION (HCC): ICD-10-CM

## 2024-12-10 RX ORDER — FLUTICASONE FUROATE AND VILANTEROL 100; 25 UG/1; UG/1
1 POWDER RESPIRATORY (INHALATION) DAILY
Qty: 60 EACH | Refills: 1 | Status: SHIPPED | OUTPATIENT
Start: 2024-12-10

## 2024-12-10 NOTE — TELEPHONE ENCOUNTER
Requesting Janie Holbrookjoana 100/25mcg  LOV: 12/14/23 Telemedicine  RTC: prn  Last ACT/AAP: 11/7/23  Filled: 9/17/24 #60 with 1 refills    No future appointments.    Asthma & COPD Medication Protocol [id number]/10/2024 01:26 PM   Protocol Details\   ACT Score greater than or equal to 20    Appointment in past 6 or next 3 months    ACT recorded in the last 12 months       Patient comment: Can I please get a refill for the next 2 months. I will be getting blood work done for my job, and i will make an appointment after I receive the blood work results.

## 2025-05-20 ENCOUNTER — PATIENT MESSAGE (OUTPATIENT)
Dept: FAMILY MEDICINE CLINIC | Facility: CLINIC | Age: 40
End: 2025-05-20

## (undated) NOTE — ED AVS SNAPSHOT
Jaz Chiang   MRN: [id number]    Department:  [facility] Emergency Department   Date of Visit:  10/18/2017           Disclosure     Insurance plans vary and the physician(s) referred by the ER may not be covered by your plan.  Please contact yo If you have been prescribed any medication(s), please fill your prescription right away and begin taking the medication(s) as directed    If the emergency physician has read X-rays, these will be re-interpreted by a radiologist.  If there is a significant

## (undated) NOTE — LETTER
ASTHMA ACTION PLAN for Tiera Fink     : 1985     Date: 2023  Provider:  Sabrina Hassan DO  Phone for doctor or clinic: [facility], [address] [facility] , [address] [place] [id number] [phone]    ACT Score: 22      You can use the colors of a traffic light to help learn about your asthma medicines. 1. Green - Go! % of Personal Best Peak Flow Use controller medicine. Breathing is good  No cough or wheeze  Can work and play Medicine How much to take When to take it    Take daily medications       2. Yellow - Caution. 50-79% Personal Best Peak  Flow. Use reliever medicine to keep an asthma attack from getting bad. Cough  Wheezing  Tight Chest  Wake up at night Medicine How much to take When to take it    Albuterol inhaler,  2 puffs every four hours as needed. Additional instructions         3. Red - Stop! Danger!  <50% Personal Best Peak  Flow. Take these medications until  Get help from a doctor   Medicine not helping  Breathing is hard and fast  Nose opens wide  Can't walk  Ribs show  Can't talk well Medicine How much to take When to take it    Albuterol inhaler,  2 puffs every four hours as needed. Additional Instructions If your symptoms do not improve and you cannot contact your doctor, [facility] or call 911 immediately! [] Asthma Action Plan reviewed with patient (and caregiver if necessary) and a copy of the plan was given to the patient/caregiver. [x] Asthma Action Plan reviewed with patient (and caregiver if necessary) on the phone and mailed copy to patient or submitted via [address]      Signatures:  Provider  Sabrina Hassan DO   Patient Caretaker

## (undated) NOTE — LETTER
ASTHMA ACTION PLAN for Cheryl Ryan     : 1985     Date: 2021  Provider:  Ramon Fowler DO  Phone for doctor or clinic: [facility], [address] [place] [address] , [address] [facility]  [phone]

## (undated) NOTE — LETTER
ASTHMA ACTION PLAN for Roe Sue     : 1985     Date: 10/21/2022  Provider:  Sang Milton DO  Phone for doctor or clinic: [facility], [address] [facility] , [address] [place] [address],[address]  [phone]           You can use the colors of a traffic light to help learn about your asthma medicines. 1. Green - Go! % of Personal Best Peak Flow Use controller medicine. Breathing is good  No cough or wheeze  Can work and play Medicine How much to take When to take it    Breo inhaler 1-2 puffs as needed daily      2. Yellow - Caution. 50-79% Personal Best Peak  Flow. Use reliever medicine to keep an asthma attack from getting bad. Cough  Wheezing  Tight Chest  Wake up at night Medicine How much to take When to take it    Ventolin HFA (albuterol) inhaler 1-2 puffs every 4-6 hours as needed       Additional instructions         3. Red - Stop! Danger!  <50% Personal Best Peak  Flow. Take these medications until  Get help from a doctor   Medicine not helping  Breathing is hard and fast  Nose opens wide  Can't walk  Ribs show  Can't talk well Medicine How much to take When to take it    Call 911 and/or PCP, or go to nearest ER     Additional Instructions If your symptoms do not improve and you cannot contact your doctor, [facility] or call 911 immediately! [x] Asthma Action Plan reviewed with patient (and caregiver if necessary) and a copy of the plan was given to the patient/caregiver. [] Asthma Action Plan reviewed with patient (and caregiver if necessary) on the phone and mailed copy to patient or submitted via [address]      Signatures:  Provider  Sang Milton DO   Patient Caretaker

## (undated) NOTE — LETTER
ASTHMA ACTION PLAN for Sachin Stone     : 1985     Date: 2020  Provider:  Qiana Murillo DO  Phone for doctor or clinic: [facility], [address] [place] [address] , [address] [facility]  [phone]